# Patient Record
(demographics unavailable — no encounter records)

---

## 2024-11-01 NOTE — HISTORY OF PRESENT ILLNESS
[de-identified] : 11/1/24: 1 year postop occasional aches in knee but overall min pain.  Numbness lateral both knees.  Previous doc: Ms. Renetta Muñiz, a 57-year-old female, presents today for B/L knees. Pt notes increased pain to the right knee. Hx of meniscal tear to left knee treated by Dr. Willson. Tolerated csi with improvement. Pt notes difficulty with walking, rising from seated position, and stairs. Pt reports working job involving repeated sitting and standing. Notes taking Diclofenac. 12/7/21: Orthovisc #1 b/l knees. 12/14/21: Orthovisc #2 b/l knees. 12/21/21: Orthovisc #3 b/l knees. 2/28/22: Min pain since HA injections. 5/3/22: Recently she has had increased pain in the right knee since going to the gym. Feels the knee is clicking 5/31/22: Continued and worsening pain in right knee- Recently the knee started to buckle on her. No injury- Injection helped for about 2 weeks   7/5/22: Cont pain in both knees, interested in HA injections. 8/5/22: Euflexxa  #1 b/l knees  8/9/22: Euflexxa #2 b/l knees, no change. 8/23/22: Euflexxa #3 b/l knees. 11/18/22: Euflexxa helped a litlte, still pain in both knees. 1/24/23: Little relief from previous Zilretta injection and she states the knee is starting to buckle on her more often- Pain is severe and and she in interested in TKA.  3/17/23: Right TKA scheduled 5/11/23.  Left knee worsening, wants to schedule left TKA as well. 5/30/23: 19 days s/p R TKA. She is doing well and notes gradual improvement. Amb with cane. 7/10/23: 2 weeks s/p left TKA, more difficult than she remembers with right knee.  No fevers/chills, using tylenol and oxy once a day. 7/25/23: PO #2 4 weeks s/p TKA. Hx of R TKA 3 months ago. Pt states she fell Sunday in her house and her LT Knee bent all the way back- experiencing pain since especially at night. Pt was taking oxy at night. Pt still in PT- happy with progression but states it is painful since the fall. Taking tylenol for pain. Pt using cane to ambulate- did not use before fall.  8/22/23: Min pain today, PT helps. [FreeTextEntry5] : 1 year follow up LT knee. Having tightness and numbness.

## 2024-11-01 NOTE — ASSESSMENT
[FreeTextEntry1] : Previous doc: 11/8/21: Pt with R knee advanced medial OA and L knee moderate OA. Does have small effusion on left - Discussed conservative tx including anti-inflammatories, PT, and injectables. Pt had temporary improvement with csi in L knee in past. Due to her travel plans, will try csi today but will plan for HA shots when she comes back. All questions answered. Recommended low impact exercises. 12/7/21: Inj tolerated well. Cortisone inj only helped for a week. 12/14/21: Inj tolerated well. 12/21/21: Inj tolerated well. 2/28/22: Min pain since injections, cont HEP and return prn. She is dealing with other ortho issues currently. 5/3/22: Acute flareup of right knee OA - inj today and discussed possible HA inj prn.  She can set up televesit prn if she wishes to get auth for HA in the future. 5/31/22: continued knee pain and CSI provided short term relief- Knee has stated to buckle on her more recently. Discussed TKA briefly. She has had good relief from HA injections and will plan for these in the future. Given HKB for stability  7/5/22: Cont pain in both knees, would like to go ahead with HA injections.  Will get auth for orthovisc both knees. 8/5/22: Inj tolerated well. 8/9/22: Inj tolerated well. 8/23/22: Inj tolerated well. 11/18/22; Not much relief from HA injections.  B/L zilretta tolerated well.  She is considering TKA. 1/24/23: Advanced (bone on bone) OA right knee - She has failed conservative treatment including PT, medications and injections. She would like to proceed with R TKA HOA. Risks and benefits discussed and all questions answered. Pain is severe pain, will try CSI b/l knees today. She will obtain CT scan right knee to evaluate bone loss.  3/17/23: Right TKA scheduled for May.  Left knee with mod/adv OA similar looking to her right knee.  Will plan for left TKA in July approx 6 weeks after right.  Need CT left knee. 5/30/23: 19 days s/p R TKA. She is doing well today and happy with progress. Begin outpatient PT. All questions and concerns were addressed. Follow up in 4 weeks and repeat XR.  7/10/23: 2 weeks postop doing very well cont PT.  No issues. 7/25/23: PO #2 s/p L TKA. XR look good-. Pt did have some acute trauma from fall but no fractures. Will send Rx for more oxy. Will continue with PT. Follow up in 1 month.   8/22/23: Doing very well cont PT/HEP, return at 1 year postop.  11/1/24: 1 year postop doing well, both knees have equal function.  Cont HEP, discussed need for abx before dentist.  Return in 2 years.

## 2024-11-01 NOTE — DISCUSSION/SUMMARY
[de-identified] : The patient was advised of the diagnosis.  The natural history of the pathology was explained in full to the patient in layman's terms. All questions were answered.  The risks and benefits of surgical and non-surgical treatment alternatives were explained in full to the patient.  I saw the patient under the supervision of Dr. Cooper and followed his plan of care.

## 2024-11-01 NOTE — PHYSICAL EXAM
[de-identified] : Left knee: Inc healed.  No effusion.  ROM 0-120.  Lig stable.  NVI.  Walks without assistance.  Right knee: Inc healed.  No effusion.  ROM 0-120.  Lig stable.  NVI.  Walks without assistance.

## 2024-12-10 NOTE — ASSESSMENT
[FreeTextEntry1] : 12/9/24: had good early result from surgery, but around June of this year developed pain below the original surgical site;  did PT for 3 of the last 6 months and the pain has progressively worsened;  MRI shows endplate edema and a large central disc herniation at the L5 S1 junctional level;  she has adjacent segment pathology/disease that developed in a rather short amount of time since the index procedure;  because she does not have radicular leg an MARGO will be of little to no value;  the pain is not facet mediated so there is no role for facet blocks;  the pain has not responded to PT to address core insufficiency;  The level should be stabilized ;  to do so would be optimal to do an L5 S1 ALIF and posterior instrumentation;  would place two new screws at S1 and tie into the existing posterior construct at L345  MRI review: Modic type 1 changes above & below prior fusion L2-3, L5-S1; partial collapse at L5-S1 with central disc protrusion with moderate central & foraminal stenosis. Compared to MRI from a year ago, significant disc degeneration at L5-S1 within a year. No radicular complaints, pain localized in lower back. Destini tried PT then HEP w/o benefit and would like a permanent fix since she's been in pain for the past 5 months & would like to pursue sx.   last visit: 58 yo F about 5 months s/p XLIF L3-5. Has been progressing well post op, had some flare up of L sided back pain after prolonged walking aggravated her foot OA. Likely altered gait could have started to aggravate her lumbar spine. XRs show construct stable. Continue PT indicated for core and leg strengthening. Oxycodone renewed she takes sparingly. Cont bone stim. f/u 6 week, consider advanced imaging if flare up of pain persists.

## 2024-12-10 NOTE — ASSESSMENT
[FreeTextEntry1] : 12/9/24: had good early result from surgery, but around June of this year developed pain below the original surgical site;  did PT for 3 of the last 6 months and the pain has progressively worsened;  MRI shows endplate edema and a large central disc herniation at the L5 S1 junctional level;  she has adjacent segment pathology/disease that developed in a rather short amount of time since the index procedure;  because she does not have radicular leg an MARGO will be of little to no value;  the pain is not facet mediated so there is no role for facet blocks;  the pain has not responded to PT to address core insufficiency;  The level should be stabilized ;  to do so would be optimal to do an L5 S1 ALIF and posterior instrumentation;  would place two new screws at S1 and tie into the existing posterior construct at L345  MRI review: Modic type 1 changes above & below prior fusion L2-3, L5-S1; partial collapse at L5-S1 with central disc protrusion with moderate central & foraminal stenosis. Compared to MRI from a year ago, significant disc degeneration at L5-S1 within a year. No radicular complaints, pain localized in lower back. Destini tried PT then HEP w/o benefit and would like a permanent fix since she's been in pain for the past 5 months & would like to pursue sx.   last visit: 60 yo F about 5 months s/p XLIF L3-5. Has been progressing well post op, had some flare up of L sided back pain after prolonged walking aggravated her foot OA. Likely altered gait could have started to aggravate her lumbar spine. XRs show construct stable. Continue PT indicated for core and leg strengthening. Oxycodone renewed she takes sparingly. Cont bone stim. f/u 6 week, consider advanced imaging if flare up of pain persists.

## 2024-12-10 NOTE — DATA REVIEWED
[MRI] : MRI [Lumbar Spine] : lumbar spine [Report was reviewed and noted in the chart] : The report was reviewed and noted in the chart [I independently reviewed and interpreted images and report] : I independently reviewed and interpreted images and report [FreeTextEntry1] : [facility]ine MRI 11/26/24 1. L2-3 Modic type I endplate changes.  2. L5-S1 Modic type I endplate changes, large central HNP with moderate lateral recess spinal stenosis,  moderate R foraminal stenosis.

## 2024-12-10 NOTE — PHYSICAL EXAM
[No bony abnormalities] : No bony abnormalities [Implants in position] : Implants in position [No loosening of hardware] : No loosening of hardware [Fusion intact] : Fusion intact [de-identified] : L Spine Inspection: dehiscence, no erythema or drainage.  Palpation: lumbosacral tenderness,  diffuse lumbar spasm ROM: Diminished in all planes Strength: 5/5 b/l hip flexors, knee extensors, ankle dorsiflexors, EHL,  4/5 ankle plantarflexors Neuro: Sensation diminished to LT bilateral L5 S1 radicular leg pain with lumbar extension + SLR bilaterally  Gait antalgic

## 2024-12-10 NOTE — PHYSICAL EXAM
[No bony abnormalities] : No bony abnormalities [Implants in position] : Implants in position [No loosening of hardware] : No loosening of hardware [Fusion intact] : Fusion intact [de-identified] : L Spine Inspection: dehiscence, no erythema or drainage.  Palpation: lumbosacral tenderness,  diffuse lumbar spasm ROM: Diminished in all planes Strength: 5/5 b/l hip flexors, knee extensors, ankle dorsiflexors, EHL,  4/5 ankle plantarflexors Neuro: Sensation diminished to LT bilateral L5 S1 radicular leg pain with lumbar extension + SLR bilaterally  Gait antalgic

## 2024-12-10 NOTE — HISTORY OF PRESENT ILLNESS
[Neck] : neck [Lower back] : lower back [Gradual] : gradual [7] : 7 [0] : 0 [Dull/Aching] : dull/aching [Radiating] : radiating [Sharp] : sharp [Tingling] : tingling [Nothing helps with pain getting better] : Nothing helps with pain getting better [de-identified] : DOS: 2/2/24 L3-5 XLIF   12/9/24: Follow up L Spine. MRI review @[facility].   11/21/24: has good and bad days;  recently though more bad days than good days;  some of the original pain has returned;  having difficulty more lately   some of the pre op symptoms have come back; stretches feeling great but then there are others when I regress;  foot condition actively being treated ;  wondering whether it's causing the back problems again;    6/27/24: 5 month s/p XLIF L3-5. Went to [place] and felt great, did a ton of walking and started to develop L foot pain. Then started to have some L sdied back pain. Went to podiatrist, told she had OA and was given CSI which helped some of the foot pain. Back pain has improved over the last few days. No radicular complaints.   5/16/24: 3 months s/p L3-5 XLIF/PSF progressively improving. Started PT since last visit which has been helpful. Taking oxycodone sparingly. Going to [place] for a trip soon.   04/04/24: 10 weeks s/p L3-5 XLIF. Doing well despite setback.   03/11/24: PO#3. 6 weeks s/p L3-5 XLIF. Has been changing gauze frequently and has noticed drainage has subsided.  03/04/24: PO#2. 1 month s/p L3-5 XLIF. Has noticed wound dehiscence and mild serous drainage for the past week. Went to PCP who swabbed wound for culture (no results yet). Has been applying sterile strips. Denies fever, chills, or sweats. No rash surrounding incision. Reports pain in left sided low back into hip, pain in thigh has been improving from preop. not taking any pain medications.   2/17/24: 1st post op s/p XLIF L3-5. Doing well. Denies radicular pain and numbness. Some stiffness and mild swelling surrounding wound. Taking oxy.   01/18/24: F/up on lower back. Persisting LBP and b/l LE pain, lateral aspect of knees and dorsal left foot. L Spine MRI review.   11/30/23: 59 year old female presents today with complaints of low back pain with BLE radicular pain and numbness to laterally to foot. Patient reports symptoms have worsened since knee replacements in May and June. Patient reports symptoms worsened while attending PT. Has been managing symptoms with RINAI with Dr. Wills, recently having R TF L3/4 L4/5 injections and L TF L3/4, L4/5 in October which provided 80% relief for only few weeks. She denies change in b/b function.  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 After returning from trip recently; started having pain down right leg 2 weeks ago.  [FreeTextEntry6] : numbness [FreeTextEntry7] : b/l arms; r shoulder; b/l legs [de-identified] : A Donnie  [de-identified] : 10/05/2022 [de-identified] : 2/2/24 [de-identified] : lumbar

## 2024-12-10 NOTE — HISTORY OF PRESENT ILLNESS
[Neck] : neck [Lower back] : lower back [Gradual] : gradual [7] : 7 [0] : 0 [Dull/Aching] : dull/aching [Radiating] : radiating [Sharp] : sharp [Tingling] : tingling [Nothing helps with pain getting better] : Nothing helps with pain getting better [de-identified] : DOS: 2/2/24 L3-5 XLIF   12/9/24: Follow up L Spine. MRI review @[facility].   11/21/24: has good and bad days;  recently though more bad days than good days;  some of the original pain has returned;  having difficulty more lately   some of the pre op symptoms have come back; stretches feeling great but then there are others when I regress;  foot condition actively being treated ;  wondering whether it's causing the back problems again;    6/27/24: 5 month s/p XLIF L3-5. Went to [place] and felt great, did a ton of walking and started to develop L foot pain. Then started to have some L sdied back pain. Went to podiatrist, told she had OA and was given CSI which helped some of the foot pain. Back pain has improved over the last few days. No radicular complaints.   5/16/24: 3 months s/p L3-5 XLIF/PSF progressively improving. Started PT since last visit which has been helpful. Taking oxycodone sparingly. Going to [place] for a trip soon.   04/04/24: 10 weeks s/p L3-5 XLIF. Doing well despite setback.   03/11/24: PO#3. 6 weeks s/p L3-5 XLIF. Has been changing gauze frequently and has noticed drainage has subsided.  03/04/24: PO#2. 1 month s/p L3-5 XLIF. Has noticed wound dehiscence and mild serous drainage for the past week. Went to PCP who swabbed wound for culture (no results yet). Has been applying sterile strips. Denies fever, chills, or sweats. No rash surrounding incision. Reports pain in left sided low back into hip, pain in thigh has been improving from preop. not taking any pain medications.   2/17/24: 1st post op s/p XLIF L3-5. Doing well. Denies radicular pain and numbness. Some stiffness and mild swelling surrounding wound. Taking oxy.   01/18/24: F/up on lower back. Persisting LBP and b/l LE pain, lateral aspect of knees and dorsal left foot. L Spine MRI review.   11/30/23: 59 year old female presents today with complaints of low back pain with BLE radicular pain and numbness to laterally to foot. Patient reports symptoms have worsened since knee replacements in May and June. Patient reports symptoms worsened while attending PT. Has been managing symptoms with RINAI with Dr. Wills, recently having R TF L3/4 L4/5 injections and L TF L3/4, L4/5 in October which provided 80% relief for only few weeks. She denies change in b/b function.  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 After returning from trip recently; started having pain down right leg 2 weeks ago.  [FreeTextEntry6] : numbness [FreeTextEntry7] : b/l arms; r shoulder; b/l legs [de-identified] : A Donnie  [de-identified] : 10/05/2022 [de-identified] : 2/2/24 [de-identified] : lumbar

## 2025-02-04 NOTE — PHYSICAL EXAM
[Implants in position] : Implants in position [No loosening of hardware] : No loosening of hardware [Fusion intact] : Fusion intact [de-identified] : L Spine Inspection: Incision clean, no signs of infection  Palpation:  Spasms in b/l lumbar paraspinal musculature ROM: diminished in all planes Strength: 5/5 b/l hip flexors, knee extensors, ankle dorsiflexors, EHL, ankle plantarflexors Neuro: Sensation LT I Negative b/l SLR Toe and heal walk intact Gait non antalgic

## 2025-02-04 NOTE — ASSESSMENT
[FreeTextEntry1] : PO#1. 2 weeks s/p L5-S1 ALIF. XR with implants in position. Incision clean. C/o residual RLE pain and left sided lower back pain. Slowly making progress.   - Educated and fitted bone stim today.  - Advised to progressively increase activity, walking and light house chores  - F/up in 1 month, Discuss PT then.

## 2025-02-04 NOTE — HISTORY OF PRESENT ILLNESS
[Neck] : neck [Lower back] : lower back [Gradual] : gradual [7] : 7 [0] : 0 [Dull/Aching] : dull/aching [Radiating] : radiating [Sharp] : sharp [Tingling] : tingling [Nothing helps with pain getting better] : Nothing helps with pain getting better [de-identified] : DOS:  01/24 ALIF L5-S1 POSTERIOR INSTRUMENTATION L5-S1; 2/2/24 L3-5 XLIF   02/4/25: PO#1. 2 weeks s/p L5-S1 ALIF. Reports pain in RLE and left side of lower back. Denies f/c/s. Preop sxs progressively improving.   12/9/24: Follow up L Spine. MRI review @[facility].   11/21/24: has good and bad days;  recently though more bad days than good days;  some of the original pain has returned;  having difficulty more lately   some of the pre op symptoms have come back; stretches feeling great but then there are others when I regress;  foot condition actively being treated ;  wondering whether it's causing the back problems again;    6/27/24: 5 month s/p XLIF L3-5. Went to [place] and felt great, did a ton of walking and started to develop L foot pain. Then started to have some L sdied back pain. Went to podiatrist, told she had OA and was given CSI which helped some of the foot pain. Back pain has improved over the last few days. No radicular complaints.   5/16/24: 3 months s/p L3-5 XLIF/PSF progressively improving. Started PT since last visit which has been helpful. Taking oxycodone sparingly. Going to [place] for a trip soon.   04/04/24: 10 weeks s/p L3-5 XLIF. Doing well despite setback.   03/11/24: PO#3. 6 weeks s/p L3-5 XLIF. Has been changing gauze frequently and has noticed drainage has subsided.  03/04/24: PO#2. 1 month s/p L3-5 XLIF. Has noticed wound dehiscence and mild serous drainage for the past week. Went to PCP who swabbed wound for culture (no results yet). Has been applying sterile strips. Denies fever, chills, or sweats. No rash surrounding incision. Reports pain in left sided low back into hip, pain in thigh has been improving from preop. not taking any pain medications.   2/17/24: 1st post op s/p XLIF L3-5. Doing well. Denies radicular pain and numbness. Some stiffness and mild swelling surrounding wound. Taking oxy.   01/18/24: F/up on lower back. Persisting LBP and b/l LE pain, lateral aspect of knees and dorsal left foot. L Spine MRI review.   11/30/23: 59 year old female presents today with complaints of low back pain with BLE radicular pain and numbness to laterally to foot. Patient reports symptoms have worsened since knee replacements in May and June. Patient reports symptoms worsened while attending PT. Has been managing symptoms with LESI with Dr. Wills, recently having R TF L3/4 L4/5 injections and L TF L3/4, L4/5 in October which provided 80% relief for only few weeks. She denies change in b/b function.  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 [FreeTextEntry6] : numbness [FreeTextEntry7] : b/l arms; r shoulder; b/l legs [de-identified] : A Donnie  [de-identified] : 10/05/2022 [de-identified] : 2/2/24 [de-identified] : lumbar

## 2025-03-04 NOTE — ASSESSMENT
[FreeTextEntry1] : PO#2. 6 weeks s/p L5-S1 ALIF. XR with implants in position. I  - continue with bone stim -  rx for therapy - willgive rx for pain meds but lower oxy to 5 mg  f/u 6 weeks

## 2025-03-04 NOTE — HISTORY OF PRESENT ILLNESS
[Neck] : neck [Lower back] : lower back [Gradual] : gradual [7] : 7 [0] : 0 [Dull/Aching] : dull/aching [Radiating] : radiating [Sharp] : sharp [Tingling] : tingling [Nothing helps with pain getting better] : Nothing helps with pain getting better [de-identified] : DOS:  01/24 ALIF L5-S1 POSTERIOR INSTRUMENTATION L5-S1; 2/2/24 L3-5 XLIF    03/04/2025: PO #2 6 weeks post op.  She notes improvement in regards to the diminished sensation over the dorsal aspect of the right foot.  She has been taking the Oxy 10 milligrams for pain she would like to lower that to the 5 mg pills at this point.  She is looking to get into physical therapy.  She does note some stiffness over the last few days as she did have a drive to [place]    02/4/25: PO#1. 2 weeks s/p L5-S1 ALIF. Reports pain in RLE and left side of lower back. Denies f/c/s. Preop sxs progressively improving.   12/9/24: Follow up L Spine. MRI review @[facility].   11/21/24: has good and bad days;  recently though more bad days than good days;  some of the original pain has returned;  having difficulty more lately   some of the pre op symptoms have come back; stretches feeling great but then there are others when I regress;  foot condition actively being treated ;  wondering whether it's causing the back problems again;    6/27/24: 5 month s/p XLIF L3-5. Went to [place] and felt great, did a ton of walking and started to develop L foot pain. Then started to have some L sdied back pain. Went to podiatrist, told she had OA and was given CSI which helped some of the foot pain. Back pain has improved over the last few days. No radicular complaints.   5/16/24: 3 months s/p L3-5 XLIF/PSF progressively improving. Started PT since last visit which has been helpful. Taking oxycodone sparingly. Going to [place] for a trip soon.   04/04/24: 10 weeks s/p L3-5 XLIF. Doing well despite setback.   03/11/24: PO#3. 6 weeks s/p L3-5 XLIF. Has been changing gauze frequently and has noticed drainage has subsided.  03/04/24: PO#2. 1 month s/p L3-5 XLIF. Has noticed wound dehiscence and mild serous drainage for the past week. Went to PCP who swabbed wound for culture (no results yet). Has been applying sterile strips. Denies fever, chills, or sweats. No rash surrounding incision. Reports pain in left sided low back into hip, pain in thigh has been improving from preop. not taking any pain medications.   2/17/24: 1st post op s/p XLIF L3-5. Doing well. Denies radicular pain and numbness. Some stiffness and mild swelling surrounding wound. Taking oxy.   01/18/24: F/up on lower back. Persisting LBP and b/l LE pain, lateral aspect of knees and dorsal left foot. L Spine MRI review.   11/30/23: 59 year old female presents today with complaints of low back pain with BLE radicular pain and numbness to laterally to foot. Patient reports symptoms have worsened since knee replacements in May and June. Patient reports symptoms worsened while attending PT. Has been managing symptoms with LESI with Dr. Wills, recently having R TF L3/4 L4/5 injections and L TF L3/4, L4/5 in October which provided 80% relief for only few weeks. She denies change in b/b function.  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 [FreeTextEntry6] : numbness [FreeTextEntry7] : b/l arms; r shoulder; b/l legs [de-identified] : A Donnie  [de-identified] : 2/2/24 [de-identified] : 10/05/2022 [de-identified] : lumbar

## 2025-03-04 NOTE — PHYSICAL EXAM
[Implants in position] : Implants in position [No loosening of hardware] : No loosening of hardware [Fusion intact] : Fusion intact [de-identified] : L Spine Inspection: Incision healed  no signs of infection  Palpation:  Spasms in b/l lumbar paraspinal musculature ROM: diminished in all planes Strength: 5/5 b/l hip flexors, knee extensors, ankle dorsiflexors, EHL, ankle plantarflexors Neuro: Sensation LT I Negative b/l SLR Toe and heal walk intact Gait non antalgic

## 2025-03-06 NOTE — PHYSICAL EXAM
[de-identified] : R hand: Mild swelling ring finger Tender 4th A1 pulley Decreased ring ROM +ring triggering

## 2025-03-06 NOTE — REASON FOR VISIT
[FreeTextEntry2] : 03/06/2025: 60-year-old female here today for: R RF pain. She states her finger is locking, stiffness at night which wakes her up.

## 2025-03-06 NOTE — ASSESSMENT
[FreeTextEntry1] :  Trigger finger is a progressive problem that once occurs will be a chronic issue that will likely continue until surgical treatment is necessary. Trigger finger creates a chronic change to the tendon/pulley system in the hand that will be present until surgical release. Treatment options for trigger finger include OTC NSAIDS, prescription NSAIDS, ice, splinting, OT, cortisone injection, and surgical release.  I recommend the patient take over the counter medication such as Advil/Motrin/Aleve or Tylenol for pain and inflammation  R RF tendon sheath injection was performed at the A1 pulley because of pain and inflammation under aseptic conditions with betadine and alcohol Anesthesia: ethyl chloride sprayed topically Celestone 6mg/1cc: An injection of Celestone 1cc Lidocaine: An injection of Lidocaine 1% 1cc Marcaine: An injection of Marcaine 0.5% 1cc 25G needle   The risks, benefits, and alternatives to cortisone injection were explained in full to the patient. Risks outlined include but are not limited to infection, sepsis, bleeding, scarring, skin discoloration, temporary increase in pain, syncopal episode, failure to resolve symptoms, allergic reaction, symptom recurrence, and elevation of blood sugar in diabetics. Patient understood the risks. All questions were answered. After discussion of options, patient verbally consented to an injection. Sterile prep was done of the injection site. Patient tolerated the procedure well. Advised to ice the injection site this evening.

## 2025-06-28 NOTE — HISTORY OF PRESENT ILLNESS
[Neck] : neck [Lower back] : lower back [Gradual] : gradual [7] : 7 [0] : 0 [Dull/Aching] : dull/aching [Radiating] : radiating [Sharp] : sharp [Tingling] : tingling [Nothing helps with pain getting better] : Nothing helps with pain getting better [de-identified] : DOS:  01/24/25 ALIF L5-S1 POSTERIOR INSTRUMENTATION L5-S1; 2/2/24 L3-5 XLIF   6/26/25 here for follow up. now 5 months s/p ALIF L5-S1 POSTERIOR INSTRUMENTATION L5-S1. Patient continues to have dull ache in low back/flank and persistent intermittent RLE pain. N/T to the first 2 toes R foot. Continues to utilize bone stimulator.   03/04/2025: PO #2 6 weeks post op.  She notes improvement in regards to the diminished sensation over the dorsal aspect of the right foot.  She has been taking the Oxy 10 milligrams for pain she would like to lower that to the 5 mg pills at this point.  She is looking to get into physical therapy.  She does note some stiffness over the last few days as she did have a drive to [place]    02/4/25: PO#1. 2 weeks s/p L5-S1 ALIF. Reports pain in RLE and left side of lower back. Denies f/c/s. Preop sxs progressively improving.   12/9/24: Follow up L Spine. MRI review @[facility].   11/21/24: has good and bad days;  recently though more bad days than good days;  some of the original pain has returned;  having difficulty more lately   some of the pre op symptoms have come back; stretches feeling great but then there are others when I regress;  foot condition actively being treated ;  wondering whether it's causing the back problems again;    6/27/24: 5 month s/p XLIF L3-5. Went to [place] and felt great, did a ton of walking and started to develop L foot pain. Then started to have some L sdied back pain. Went to podiatrist, told she had OA and was given CSI which helped some of the foot pain. Back pain has improved over the last few days. No radicular complaints.   5/16/24: 3 months s/p L3-5 XLIF/PSF progressively improving. Started PT since last visit which has been helpful. Taking oxycodone sparingly. Going to [employer] for a trip soon.   04/04/24: 10 weeks s/p L3-5 XLIF. Doing well despite setback.   03/11/24: PO#3. 6 weeks s/p L3-5 XLIF. Has been changing gauze frequently and has noticed drainage has subsided.  03/04/24: PO#2. 1 month s/p L3-5 XLIF. Has noticed wound dehiscence and mild serous drainage for the past week. Went to PCP who swabbed wound for culture (no results yet). Has been applying sterile strips. Denies fever, chills, or sweats. No rash surrounding incision. Reports pain in left sided low back into hip, pain in thigh has been improving from preop. not taking any pain medications.   2/17/24: 1st post op s/p XLIF L3-5. Doing well. Denies radicular pain and numbness. Some stiffness and mild swelling surrounding wound. Taking oxy.   01/18/24: F/up on lower back. Persisting LBP and b/l LE pain, lateral aspect of knees and dorsal left foot. L Spine MRI review.   11/30/23: 59 year old female presents today with complaints of low back pain with BLE radicular pain and numbness to laterally to foot. Patient reports symptoms have worsened since knee replacements in May and June. Patient reports symptoms worsened while attending PT. Has been managing symptoms with LESI with Dr. Wills, recently having R TF L3/4 L4/5 injections and L TF L3/4, L4/5 in October which provided 80% relief for only few weeks. She denies change in b/b function.  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 [FreeTextEntry6] : numbness [FreeTextEntry7] : b/l arms; r shoulder; b/l legs [de-identified] : A Donnie  [de-identified] : 10/05/2022 [de-identified] : 2/2/24 [de-identified] : lumbar

## 2025-06-28 NOTE — ASSESSMENT
[FreeTextEntry1] : PO#3. 5 months s/p L5-S1 ALIF. XR with implants in position.  - continue with bone stimulator -c/w tizanidine prn spasm as directed by pcp -c/w HEP -Pain meds rx given today due to upcoming road trip. x4 a day for 20 days. -f/u 3 months

## 2025-06-28 NOTE — PHYSICAL EXAM
[Implants in position] : Implants in position [No loosening of hardware] : No loosening of hardware [Fusion intact] : Fusion intact [de-identified] : L Spine Inspection: Incision healed  no signs of infection  Palpation:  Spasms in b/l lumbar paraspinal musculature ROM: diminished in all planes Strength: 5/5 b/l hip flexors, knee extensors, ankle dorsiflexors, EHL, ankle plantarflexors Neuro: Sensation LT I Negative b/l SLR Toe and heal walk intact Gait non antalgic

## 2025-06-28 NOTE — PHYSICAL EXAM
[Implants in position] : Implants in position [No loosening of hardware] : No loosening of hardware [Fusion intact] : Fusion intact [de-identified] : L Spine Inspection: Incision healed  no signs of infection  Palpation:  Spasms in b/l lumbar paraspinal musculature ROM: diminished in all planes Strength: 5/5 b/l hip flexors, knee extensors, ankle dorsiflexors, EHL, ankle plantarflexors Neuro: Sensation LT I Negative b/l SLR Toe and heal walk intact Gait non antalgic

## 2025-06-28 NOTE — PHYSICAL EXAM
[Implants in position] : Implants in position [No loosening of hardware] : No loosening of hardware [Fusion intact] : Fusion intact [de-identified] : L Spine Inspection: Incision healed  no signs of infection  Palpation:  Spasms in b/l lumbar paraspinal musculature ROM: diminished in all planes Strength: 5/5 b/l hip flexors, knee extensors, ankle dorsiflexors, EHL, ankle plantarflexors Neuro: Sensation LT I Negative b/l SLR Toe and heal walk intact Gait non antalgic

## 2025-06-28 NOTE — HISTORY OF PRESENT ILLNESS
[Neck] : neck [Lower back] : lower back [Gradual] : gradual [7] : 7 [0] : 0 [Dull/Aching] : dull/aching [Radiating] : radiating [Sharp] : sharp [Tingling] : tingling [Nothing helps with pain getting better] : Nothing helps with pain getting better [de-identified] : DOS:  01/24/25 ALIF L5-S1 POSTERIOR INSTRUMENTATION L5-S1; 2/2/24 L3-5 XLIF   6/26/25 here for follow up. now 5 months s/p ALIF L5-S1 POSTERIOR INSTRUMENTATION L5-S1. Patient continues to have dull ache in low back/flank and persistent intermittent RLE pain. N/T to the first 2 toes R foot. Continues to utilize bone stimulator.   03/04/2025: PO #2 6 weeks post op.  She notes improvement in regards to the diminished sensation over the dorsal aspect of the right foot.  She has been taking the Oxy 10 milligrams for pain she would like to lower that to the 5 mg pills at this point.  She is looking to get into physical therapy.  She does note some stiffness over the last few days as she did have a drive to [place]    02/4/25: PO#1. 2 weeks s/p L5-S1 ALIF. Reports pain in RLE and left side of lower back. Denies f/c/s. Preop sxs progressively improving.   12/9/24: Follow up L Spine. MRI review @[facility].   11/21/24: has good and bad days;  recently though more bad days than good days;  some of the original pain has returned;  having difficulty more lately   some of the pre op symptoms have come back; stretches feeling great but then there are others when I regress;  foot condition actively being treated ;  wondering whether it's causing the back problems again;    6/27/24: 5 month s/p XLIF L3-5. Went to [place] and felt great, did a ton of walking and started to develop L foot pain. Then started to have some L sdied back pain. Went to podiatrist, told she had OA and was given CSI which helped some of the foot pain. Back pain has improved over the last few days. No radicular complaints.   5/16/24: 3 months s/p L3-5 XLIF/PSF progressively improving. Started PT since last visit which has been helpful. Taking oxycodone sparingly. Going to [employer] for a trip soon.   04/04/24: 10 weeks s/p L3-5 XLIF. Doing well despite setback.   03/11/24: PO#3. 6 weeks s/p L3-5 XLIF. Has been changing gauze frequently and has noticed drainage has subsided.  03/04/24: PO#2. 1 month s/p L3-5 XLIF. Has noticed wound dehiscence and mild serous drainage for the past week. Went to PCP who swabbed wound for culture (no results yet). Has been applying sterile strips. Denies fever, chills, or sweats. No rash surrounding incision. Reports pain in left sided low back into hip, pain in thigh has been improving from preop. not taking any pain medications.   2/17/24: 1st post op s/p XLIF L3-5. Doing well. Denies radicular pain and numbness. Some stiffness and mild swelling surrounding wound. Taking oxy.   01/18/24: F/up on lower back. Persisting LBP and b/l LE pain, lateral aspect of knees and dorsal left foot. L Spine MRI review.   11/30/23: 59 year old female presents today with complaints of low back pain with BLE radicular pain and numbness to laterally to foot. Patient reports symptoms have worsened since knee replacements in May and June. Patient reports symptoms worsened while attending PT. Has been managing symptoms with LESI with Dr. Wills, recently having R TF L3/4 L4/5 injections and L TF L3/4, L4/5 in October which provided 80% relief for only few weeks. She denies change in b/b function.  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 [FreeTextEntry6] : numbness [FreeTextEntry7] : b/l arms; r shoulder; b/l legs [de-identified] : A Donnie  [de-identified] : 10/05/2022 [de-identified] : 2/2/24 [de-identified] : lumbar

## 2025-06-28 NOTE — HISTORY OF PRESENT ILLNESS
[Neck] : neck [Lower back] : lower back [Gradual] : gradual [7] : 7 [0] : 0 [Dull/Aching] : dull/aching [Radiating] : radiating [Sharp] : sharp [Tingling] : tingling [Nothing helps with pain getting better] : Nothing helps with pain getting better [de-identified] : DOS:  01/24/25 ALIF L5-S1 POSTERIOR INSTRUMENTATION L5-S1; 2/2/24 L3-5 XLIF   6/26/25 here for follow up. now 5 months s/p ALIF L5-S1 POSTERIOR INSTRUMENTATION L5-S1. Patient continues to have dull ache in low back/flank and persistent intermittent RLE pain. N/T to the first 2 toes R foot. Continues to utilize bone stimulator.   03/04/2025: PO #2 6 weeks post op.  She notes improvement in regards to the diminished sensation over the dorsal aspect of the right foot.  She has been taking the Oxy 10 milligrams for pain she would like to lower that to the 5 mg pills at this point.  She is looking to get into physical therapy.  She does note some stiffness over the last few days as she did have a drive to [place]    02/4/25: PO#1. 2 weeks s/p L5-S1 ALIF. Reports pain in RLE and left side of lower back. Denies f/c/s. Preop sxs progressively improving.   12/9/24: Follow up L Spine. MRI review @[facility].   11/21/24: has good and bad days;  recently though more bad days than good days;  some of the original pain has returned;  having difficulty more lately   some of the pre op symptoms have come back; stretches feeling great but then there are others when I regress;  foot condition actively being treated ;  wondering whether it's causing the back problems again;    6/27/24: 5 month s/p XLIF L3-5. Went to [place] and felt great, did a ton of walking and started to develop L foot pain. Then started to have some L sdied back pain. Went to podiatrist, told she had OA and was given CSI which helped some of the foot pain. Back pain has improved over the last few days. No radicular complaints.   5/16/24: 3 months s/p L3-5 XLIF/PSF progressively improving. Started PT since last visit which has been helpful. Taking oxycodone sparingly. Going to [place] for a trip soon.   04/04/24: 10 weeks s/p L3-5 XLIF. Doing well despite setback.   03/11/24: PO#3. 6 weeks s/p L3-5 XLIF. Has been changing gauze frequently and has noticed drainage has subsided.  03/04/24: PO#2. 1 month s/p L3-5 XLIF. Has noticed wound dehiscence and mild serous drainage for the past week. Went to PCP who swabbed wound for culture (no results yet). Has been applying sterile strips. Denies fever, chills, or sweats. No rash surrounding incision. Reports pain in left sided low back into hip, pain in thigh has been improving from preop. not taking any pain medications.   2/17/24: 1st post op s/p XLIF L3-5. Doing well. Denies radicular pain and numbness. Some stiffness and mild swelling surrounding wound. Taking oxy.   01/18/24: F/up on lower back. Persisting LBP and b/l LE pain, lateral aspect of knees and dorsal left foot. L Spine MRI review.   11/30/23: 59 year old female presents today with complaints of low back pain with BLE radicular pain and numbness to laterally to foot. Patient reports symptoms have worsened since knee replacements in May and June. Patient reports symptoms worsened while attending PT. Has been managing symptoms with LESI with Dr. Wills, recently having R TF L3/4 L4/5 injections and L TF L3/4, L4/5 in October which provided 80% relief for only few weeks. She denies change in b/b function.  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 [FreeTextEntry6] : numbness [FreeTextEntry7] : b/l arms; r shoulder; b/l legs [de-identified] : A Donnie  [de-identified] : 10/05/2022 [de-identified] : 2/2/24 [de-identified] : lumbar

## 2025-07-28 NOTE — PHYSICAL EXAM
[de-identified] : R hand: Mild swelling ring finger Tender 4th A1 pulley Decreased ring ROM +ring triggering

## 2025-07-28 NOTE — HISTORY OF PRESENT ILLNESS
[5] : 5 [3] : 3 [Dull/Aching] : dull/aching [de-identified] : R RF triggering Last R RF triggering injection was 3/6/2025, which was helpful  [FreeTextEntry1] : R hand

## 2025-07-28 NOTE — HISTORY OF PRESENT ILLNESS
[5] : 5 [3] : 3 [Dull/Aching] : dull/aching [de-identified] : R RF triggering Last R RF triggering injection was 3/6/2025, which was helpful  [FreeTextEntry1] : R hand

## 2025-07-28 NOTE — REASON FOR VISIT
[FreeTextEntry2] :  07/28/2025:  60-year-old female here today for: follow up R hand pain and trigger finger

## 2025-07-28 NOTE — PHYSICAL EXAM
[de-identified] : R hand: Mild swelling ring finger Tender 4th A1 pulley Decreased ring ROM +ring triggering